# Patient Record
Sex: FEMALE | Race: WHITE | NOT HISPANIC OR LATINO | URBAN - METROPOLITAN AREA
[De-identification: names, ages, dates, MRNs, and addresses within clinical notes are randomized per-mention and may not be internally consistent; named-entity substitution may affect disease eponyms.]

---

## 2021-01-13 ENCOUNTER — NURSING HOME VISIT (OUTPATIENT)
Dept: GERIATRICS | Facility: OTHER | Age: 86
End: 2021-01-13
Payer: MEDICARE

## 2021-01-13 DIAGNOSIS — R63.4 WEIGHT LOSS: ICD-10-CM

## 2021-01-13 DIAGNOSIS — F43.21 ADJUSTMENT DISORDER WITH DEPRESSED MOOD: ICD-10-CM

## 2021-01-13 DIAGNOSIS — I10 ESSENTIAL HYPERTENSION: ICD-10-CM

## 2021-01-13 DIAGNOSIS — R50.9 ELEVATED TEMPERATURE: ICD-10-CM

## 2021-01-13 DIAGNOSIS — G30.1 LATE ONSET ALZHEIMER'S DISEASE WITH BEHAVIORAL DISTURBANCE (HCC): ICD-10-CM

## 2021-01-13 DIAGNOSIS — I48.21 PERMANENT ATRIAL FIBRILLATION (HCC): ICD-10-CM

## 2021-01-13 DIAGNOSIS — F02.81 LATE ONSET ALZHEIMER'S DISEASE WITH BEHAVIORAL DISTURBANCE (HCC): ICD-10-CM

## 2021-01-13 DIAGNOSIS — S32.592A OTHER SPECIFIED FRACTURE OF LEFT PUBIS, INITIAL ENCOUNTER FOR CLOSED FRACTURE (HCC): ICD-10-CM

## 2021-01-13 DIAGNOSIS — R26.2 AMBULATORY DYSFUNCTION: Primary | ICD-10-CM

## 2021-01-13 PROBLEM — R79.89 LOW VITAMIN D LEVEL: Status: ACTIVE | Noted: 2020-12-21

## 2021-01-13 PROBLEM — M48.062 SPINAL STENOSIS OF LUMBAR REGION WITH NEUROGENIC CLAUDICATION: Status: ACTIVE | Noted: 2020-06-30

## 2021-01-13 PROBLEM — M47.817 SPONDYLOSIS OF LUMBOSACRAL JOINT WITHOUT MYELOPATHY: Status: ACTIVE | Noted: 2020-06-30

## 2021-01-13 PROBLEM — F32.9 REACTIVE DEPRESSION: Status: ACTIVE | Noted: 2020-12-21

## 2021-01-13 PROBLEM — E78.5 HYPERLIPIDEMIA: Status: ACTIVE | Noted: 2021-01-13

## 2021-01-13 PROBLEM — N63.20 BREAST MASS, LEFT: Status: ACTIVE | Noted: 2017-08-17

## 2021-01-13 PROCEDURE — 99306 1ST NF CARE HIGH MDM 50: CPT | Performed by: FAMILY MEDICINE

## 2021-01-13 RX ORDER — MELATONIN
1000 DAILY
COMMUNITY

## 2021-01-13 RX ORDER — CALCIUM CARBONATE 200(500)MG
1 TABLET,CHEWABLE ORAL DAILY
COMMUNITY

## 2021-01-13 RX ORDER — NAPROXEN SODIUM 220 MG
220 TABLET ORAL DAILY
COMMUNITY

## 2021-01-13 RX ORDER — ASPIRIN 81 MG/1
81 TABLET, CHEWABLE ORAL DAILY
COMMUNITY

## 2021-01-13 RX ORDER — AMOXICILLIN 250 MG
1 CAPSULE ORAL DAILY
COMMUNITY

## 2021-01-13 RX ORDER — DILTIAZEM HYDROCHLORIDE 240 MG/1
240 CAPSULE, EXTENDED RELEASE ORAL DAILY
COMMUNITY

## 2021-01-13 RX ORDER — CITALOPRAM 10 MG/1
10 TABLET ORAL DAILY
COMMUNITY
End: 2021-03-15

## 2021-01-13 NOTE — PROGRESS NOTES
[address]  Facility: [facility]      NAME: Jaclyn Amaya  AGE: 80 y o  SEX: female    DATE OF ENCOUNTER: 1/13/2021    Code status:  CPR    Assessment and Plan     Ambulatory dysfunction  Multifactorial  Needs NH care  Continues with PT/OT  Late onset Alzheimer's disease with behavioral disturbance ([address] )  No behaviors  Loosing wt  Will continue with monitoring  Needs NH care  St on board  Permanent atrial fibrillation (HCC)  On  Digoxin  HR Controlled with Diltiazem, and ASA  Will check dig level  Hypertension  On Diltiazem  Stable BP  Will monitor closely  Adjustment disorder with depressed mood  On low dose of Celexa  E-lytes stable on today's lab  Will continue with monitoring  Other specified fracture of left pubis, initial encounter for closed fracture (HCC)  No pain  F/u with ortho this month  Continues with therapy  Stable  Weight loss  Variable meal completion  Will check labs  Elevated temperature  Close monitoring  Will check labs  Vitals q shift for 3 days  All medications and routine orders were reviewed and updated as needed  Plan discussed with: Nurse    Chief Complaint     Pt has no complaint     History of Present Illness   Pt with [facility] and medical problem as this note who was admitted to [facility] on 12/19/2021 under a [facility] provider after hospitalization due to left pubic rami fracture  She is not at C2 unit under my care  Pt with Afib, HTN, alzheimer's disease, and ambulatory dysfucntion  Pt has been loosing wt with variable meal completion  Had BMP today which is stable  She has been started on low dose of Celexa on 12/30 for depression/adjustment disorder  BP is stable  HR stable  Pt continues with NH care, and is getting therapy  Pt has no complaint  Pt has orthopedic appointment on 1/27  Denies having any pain  Pt had low grade temp yesterday         HISTORY:  Past Medical History: Diagnosis Date    Alzheimer disease [facility] )     Breast mass, left     Closed [facility] of multiple pubic rami, [facility]     HLD (hyperlipidemia)     HTN (hypertension)     Vertebral artery stenosis      Family History   Family history unknown: Yes     Social History     Socioeconomic History    Marital status:      Spouse name: None    Number of children: None    Years of education: None    Highest education level: None   Occupational History    None   Social Needs    Financial resource strain: None    Food insecurity     Worry: None     Inability: None    Transportation needs     Medical: None     Non-medical: None   Tobacco Use    Smoking status: Former Smoker    Smokeless tobacco: Never Used   Substance and Sexual Activity    Alcohol use: Not Currently    Drug use: Never    Sexual activity: Not Currently   Lifestyle    Physical activity     Days per week: None     Minutes per session: None    Stress: None   Relationships    Social connections     Talks on phone: None     Gets together: None     Attends Yazidism service: None     Active member of club or organization: None     Attends meetings of clubs or organizations: None     Relationship status: None    Intimate partner violence     Fear of current or ex partner: None     Emotionally abused: None     Physically abused: None     Forced sexual activity: None   Other Topics Concern    None   Social History Narrative    None       Allergies:  Ace inh, Cisapride, PCN    Review of Systems     Review of Systems   Unable to perform ROS: Dementia   Constitutional: Negative  "I'm fine"   HENT: Negative  Eyes: Negative  Respiratory: Negative  Cardiovascular: Negative  Gastrointestinal: Negative  Endocrine: Negative  Genitourinary: Negative  Musculoskeletal: Negative  Skin: Negative  Allergic/Immunologic: Negative  Neurological: Negative  Hematological: Negative  Psychiatric/Behavioral: Negative  All other systems reviewed and are negative  Medications and orders     All medications reviewed and updated in Nursing Home EMR  Objective     Vitals: wt:131 4Ibs        BP:130/59     Temp:98 5     MS:84   RR:20    Physical Exam  Vitals signs and nursing note reviewed  Constitutional:       General: She is not in acute distress  Appearance: Normal appearance  She is well-developed  She is not ill-appearing, toxic-appearing or diaphoretic  HENT:      Head: Normocephalic and atraumatic  Right Ear: External ear normal       Left Ear: External ear normal       Ears:      Comments: [place]     Nose: Nose normal  No congestion or rhinorrhea  Mouth/Throat:      Pharynx: No oropharyngeal exudate or posterior oropharyngeal erythema  Eyes:      General: No scleral icterus  Right eye: No discharge  Left eye: No discharge  Conjunctiva/sclera: Conjunctivae normal       Pupils: Pupils are equal, round, and reactive to light  Neck:      Musculoskeletal: Normal range of motion and neck supple  No neck rigidity or muscular tenderness  Cardiovascular:      Rate and Rhythm: Normal rate  Rhythm irregular  Heart sounds: Normal heart sounds  No murmur  No friction rub  No gallop  Comments: Irregular, irregular  Pulmonary:      Effort: Pulmonary effort is normal  No respiratory distress  Breath sounds: Normal breath sounds  No stridor  No wheezing, rhonchi or rales  Chest:      Chest wall: No tenderness  Abdominal:      General: Abdomen is flat  Bowel sounds are normal  There is no distension  Palpations: Abdomen is soft  There is no mass  Tenderness: There is no abdominal tenderness  There is no guarding or rebound  Hernia: No hernia is present  Musculoskeletal:         General: Deformity (bunion of b/L great toes ) present  No tenderness or signs of injury  Right lower leg: No edema        Left lower leg: No edema  Comments: In bed, limited ROM of UEs and LEs  Lymphadenopathy:      Cervical: No cervical adenopathy  Skin:     General: Skin is warm and dry  Coloration: Skin is not jaundiced or pale  Findings: Erythema (slight erythema of left inner great toe ) present  No bruising, lesion or rash  Neurological:      Mental Status: She is alert  Cranial Nerves: Cranial nerve deficit ([place]) present  Comments: Not oriented, follows commands    Psychiatric:         Judgment: Judgment normal          Pertinent Laboratory/Diagnostic Studies: The following labs/studies were reviewed please see chart or hospital paperwork for details  BMP today     Lab Results   Component Value Date     WBC 9 2 12/17/2020     HGB 12 0 12/17/2020     HCT 35 2 12/17/2020     MCV 95 12/17/2020      12/17/2020       Lab Results   Component Value Date     GLUCOSE 155 (H) 12/19/2020     BUN 13 12/19/2020     CREATININE 0 81 12/19/2020     CALCIUM 9 6 12/19/2020      12/19/2020     K 3 5 12/19/2020     CO2 25 12/19/2020      12/19/2020     ALKPHOS 111 12/16/2020     ALBUMIN 3 5 12/16/2020     BILITOT 1 6 (H) 12/16/2020     PROT 7 5 12/16/2020     AST 24 12/16/2020     ALT 18 12/16/2020     ANIONGAP 8 12/19/2020     GFRC 63 12/19/2020   No results found for: [id number]   Lab Results   Component Value Date     TSH 1 55 07/27/2017       Imaging Review: Rachel Iqbal   Result   ECG 12-LEAD Status: Normal 12/16/2020 12:05 PM     Narrative EXAM REASON:   INTERPRETATION:   SINUS RHYTHM PREMATURE ATRIAL COMPLEXES   CANNOT RULE OUT ANTERIOR INFARCT (CITED ON OR BEFORE 11-JUN-2020)   ABNORMAL ECG   WHEN COMPARED WITH ECG OF 11-JUN-2020 11:57,   WA INTERVAL HAS DECREASED   Confirmed by AUDIE CONNORS, Ritesh Conde () on 12/16/2020 1:20:41 PM       Result   CT PELVIS WO CONTRAST Status: Normal 12/16/2020 4:23 PM     Narrative History: Fall   Abnormal x-ray with concern for fracture        CT scan of the pelvis without IV contrast  Comparison prior examination dated   6/11/2020        Contiguous axial thin collimation images of the pelvis were performed  Sagittal   and coronal reconstructions were performed from the axially acquired data   Valeta Rana is a fracture involving the body of the pubis on the left extending to   the pubic symphysis with partial callus formation  There is a fracture involving   the left inferior pubic ramus  Degenerative changes lower lumbar spine    Sigmoid diverticulosis with no evidence of diverticulitis  Status post   hysterectomy  No mass or free fluid         Impression IMPRESSION: Fracture with callus formation involving the body of the pubis on   the left    Left inferior pubic ramus fracture, likely acute        CT examination performed with dose lowering protocol in accordance with [employer]                   Workstation:[id number]       Result   XR CHEST 1 VW Status: Normal 12/16/2020 1:06 PM     Narrative Clinical: Preop  Elenita Jurist: Supine AP frontal chest x-ray       COMPARISON: Chest x-ray dated 6/11/2020       FINDINGS: The heart is not enlarged  Calcifications are seen in the thoracic   aorta   The lungs are clear          Impression IMPRESSION: No acute pulmonary disease   Leroy Leija MD  1/13/2021 5:11 PM

## 2021-02-07 NOTE — PROGRESS NOTES
[facility]  [address]  [phone]  Facility: [place] [address]          NAME: Aparna Amaya  AGE: 80 y o  SEX: female    DATE OF ENCOUNTER: 2/8/2021    Chief Complaint     Pt has no complaint this am    History of Present Illness     HPI    The following portions of the patient's history were reviewed and updated as appropriate (from facility chart and hospital records): allergies, current medications, past family history, past medical history, past social history, past surgical history and problem list  Pt had recent fall about couple of weeks ago  Pt has been having increased signs of behaviors and delusions for example the other night was saying her aunt works here and she wants to see her!   Pt has signs of anxiety specially toward afternoon and evening  Poor po intake  Continues with NH care  Pt had her 2nd covid-19 vaccine last week  Review of Systems     Review of Systems   Unable to perform ROS: Dementia       Active Problem List     Patient Active Problem List   Diagnosis    Breast mass, left    Hyperlipidemia    Hypertension    Late onset Alzheimer's disease with behavioral disturbance ([address] )    Low vitamin D level    Other specified fracture of left pubis, initial encounter for closed fracture ([address] )    Reactive depression    Spinal stenosis of lumbar region with neurogenic claudication    Spondylosis of lumbosacral joint without myelopathy    Permanent atrial fibrillation ([address] )    Ambulatory dysfunction    Adjustment disorder with depressed mood    Weight loss    Elevated temperature       Objective     Vitals:temp:99 1 today     Physical Exam  Vitals signs and nursing note reviewed  Constitutional:       General: She is not in acute distress  Appearance: Normal appearance  She is well-developed  She is not ill-appearing, toxic-appearing or diaphoretic  HENT:      Head: Normocephalic and atraumatic        Right Ear: External ear normal       Left Ear: External ear normal       Ears:      Comments: [place]     Nose: Nose normal       Mouth/Throat:      Mouth: Mucous membranes are moist    Eyes:      General: No scleral icterus  Right eye: No discharge  Left eye: No discharge  Conjunctiva/sclera: Conjunctivae normal       Pupils: Pupils are equal, round, and reactive to light  Neck:      Musculoskeletal: Normal range of motion and neck supple  No neck rigidity or muscular tenderness  Cardiovascular:      Rate and Rhythm: Normal rate  Rhythm irregular  Heart sounds: Normal heart sounds  No murmur  No friction rub  No gallop  Comments: Irregular, irregular  Pulmonary:      Effort: Pulmonary effort is normal  No respiratory distress  Breath sounds: Normal breath sounds  No stridor  No wheezing, rhonchi or rales  Chest:      Chest wall: No tenderness  Abdominal:      General: Bowel sounds are normal  There is no distension  Palpations: Abdomen is soft  There is no mass  Tenderness: There is no abdominal tenderness  There is no guarding or rebound  Hernia: No hernia is present  Genitourinary:     Comments: Deferred  Musculoskeletal:         General: Deformity (B/L great toes bunions ) present  No swelling, tenderness or signs of injury  Right lower leg: No edema  Left lower leg: No edema  Comments: In bed, lying limited ROM  Lymphadenopathy:      Cervical: No cervical adenopathy  Skin:     General: Skin is warm and dry  Coloration: Skin is not jaundiced or pale  Findings: Erythema (R foerarm has two sueprficial opening  ) present  No bruising, lesion or rash  Comments: Didn't examine sacral area  Neurological:      Mental Status: She is alert  Cranial Nerves: Cranial nerve deficit ([place]) present  Comments: Follows simple commands      Psychiatric:         Behavior: Behavior normal          Pertinent Laboratory/Diagnostic Studies:  CBC, BMP done today stable, Dig level on 1/15    Current Medications   Medication list in facility chart was reviewed and necessary changes made  Assessment and Plan     Late onset Alzheimer's disease with behavioral disturbance ([address] )  Labs stable today  Sundowning  Partly due to increased anxiety  Needs NH care, added 3 mg Melatonin, will increased Celexa to 20 mg with close monitoring  Adjustment disorder with depressed mood  On Celexa 10mg, will increase celexa to 20 mg, close monitoring, repeat BMP in 2 weeks  Melatonin 3mg po q hs       Carlitos Perrin MD  2/8/2021

## 2021-02-08 ENCOUNTER — NURSING HOME VISIT (OUTPATIENT)
Dept: GERIATRICS | Facility: OTHER | Age: 86
End: 2021-02-08
Payer: MEDICARE

## 2021-02-08 DIAGNOSIS — G30.1 LATE ONSET ALZHEIMER'S DISEASE WITH BEHAVIORAL DISTURBANCE (HCC): Primary | ICD-10-CM

## 2021-02-08 DIAGNOSIS — F43.21 ADJUSTMENT DISORDER WITH DEPRESSED MOOD: ICD-10-CM

## 2021-02-08 DIAGNOSIS — F02.81 LATE ONSET ALZHEIMER'S DISEASE WITH BEHAVIORAL DISTURBANCE (HCC): Primary | ICD-10-CM

## 2021-02-08 PROCEDURE — 99308 SBSQ NF CARE LOW MDM 20: CPT | Performed by: FAMILY MEDICINE

## 2021-02-08 RX ORDER — MIRTAZAPINE 7.5 MG/1
7.5 TABLET, FILM COATED ORAL
COMMUNITY

## 2021-02-08 NOTE — ASSESSMENT & PLAN NOTE
Labs stable today  Sundowning  Partly due to increased anxiety  Needs NH care, added 3 mg Melatonin, will increased Celexa to 20 mg with close monitoring

## 2021-02-08 NOTE — ASSESSMENT & PLAN NOTE
On Celexa 10mg, will increase celexa to 20 mg, close monitoring, repeat BMP in 2 weeks  Melatonin 3mg po q hs

## 2021-02-12 ENCOUNTER — NURSING HOME VISIT (OUTPATIENT)
Dept: GERIATRICS | Facility: OTHER | Age: 86
End: 2021-02-12
Payer: MEDICARE

## 2021-02-12 DIAGNOSIS — R63.4 WEIGHT LOSS: ICD-10-CM

## 2021-02-12 DIAGNOSIS — I10 ESSENTIAL HYPERTENSION: ICD-10-CM

## 2021-02-12 DIAGNOSIS — R26.2 AMBULATORY DYSFUNCTION: ICD-10-CM

## 2021-02-12 DIAGNOSIS — F43.21 ADJUSTMENT DISORDER WITH DEPRESSED MOOD: ICD-10-CM

## 2021-02-12 DIAGNOSIS — G30.1 LATE ONSET ALZHEIMER'S DISEASE WITH BEHAVIORAL DISTURBANCE (HCC): Primary | ICD-10-CM

## 2021-02-12 DIAGNOSIS — F02.81 LATE ONSET ALZHEIMER'S DISEASE WITH BEHAVIORAL DISTURBANCE (HCC): Primary | ICD-10-CM

## 2021-02-12 DIAGNOSIS — I48.21 PERMANENT ATRIAL FIBRILLATION (HCC): ICD-10-CM

## 2021-02-12 PROCEDURE — 99309 SBSQ NF CARE MODERATE MDM 30: CPT | Performed by: FAMILY MEDICINE

## 2021-02-12 NOTE — PROGRESS NOTES
[facility]  [address]  [phone]  Facility: [place] [address]          NAME: Amanda Amaya  AGE: 80 y o  SEX: female    DATE OF ENCOUNTER: 2/12/2021    Chief Complaint     Pt denies any complaint     History of Present Illness     HPI    The following portions of the patient's history were reviewed and updated as appropriate (from facility chart and hospital records): allergies, current medications, past family history, past medical history, past social history, past surgical history and problem list  Pt was seen and examined for f/u on Dementia, ambulatory dysfunction, Afib, HTN, adjustment disorder, wt loss  pt was seen on 2/8 due to increased behaviors and anxiety, her Celexa was increased in dose but pt never got the increased dose since she refused her meds for few days and her behaviors surprisingly got better, so her celexa was actually decreased in dose, pt was also put on Aricept since 2 days ago and she has been stable  Per therapy pt has had some problem with accepting male therapist but she has been cooperative with female OT or PT staff  Pt's wt stable  HR, and BP stable  Pt continues with NH care  No other specific issues or concerns  Review of Systems     Review of Systems   Unable to perform ROS: Dementia   Constitutional: Negative  HENT: Negative  Eyes: Negative  Respiratory: Negative  Cardiovascular: Negative  Gastrointestinal: Negative  Endocrine: Negative  Genitourinary: Negative  Musculoskeletal: Negative  Skin: Negative  Allergic/Immunologic: Negative  Neurological: Negative  Hematological: Negative  Psychiatric/Behavioral: Negative  All other systems reviewed and are negative        Active Problem List     Patient Active Problem List   Diagnosis    Breast mass, left    Hyperlipidemia    Hypertension    Late onset Alzheimer's disease with behavioral disturbance ([facility] )    Low vitamin D level    Other specified fracture of left pubis, initial encounter for closed fracture ([address] )    Reactive depression    Spinal stenosis of lumbar region with neurogenic claudication    Spondylosis of lumbosacral joint without myelopathy    Permanent atrial fibrillation (HCC)    Ambulatory dysfunction    Adjustment disorder with depressed mood    Weight loss    Elevated temperature       Objective     Vitals: wt:130 5Ibs        BP:138/72       MO:76    RR:18   Temp:98 7    Physical Exam  Vitals signs and nursing note reviewed  Constitutional:       General: She is not in acute distress  Appearance: Normal appearance  She is well-developed  She is not ill-appearing, toxic-appearing or diaphoretic  HENT:      Head: Normocephalic and atraumatic  Right Ear: External ear normal       Left Ear: External ear normal       Ears:      Comments: [place]     Nose: Nose normal  No congestion or rhinorrhea  Mouth/Throat:      Mouth: Mucous membranes are moist    Eyes:      General: No scleral icterus  Right eye: No discharge  Left eye: No discharge  Conjunctiva/sclera: Conjunctivae normal       Pupils: Pupils are equal, round, and reactive to light  Neck:      Musculoskeletal: Normal range of motion and neck supple  No neck rigidity or muscular tenderness  Cardiovascular:      Rate and Rhythm: Normal rate  Rhythm irregular  Heart sounds: Normal heart sounds  No murmur  No friction rub  No gallop  Comments: Irregular, irregular  Pulmonary:      Effort: Pulmonary effort is normal  No respiratory distress  Breath sounds: Normal breath sounds  No stridor  No wheezing, rhonchi or rales  Chest:      Chest wall: No tenderness  Abdominal:      General: Bowel sounds are normal  There is no distension  Palpations: Abdomen is soft  There is no mass  Tenderness: There is no abdominal tenderness  There is no guarding or rebound  Hernia: No hernia is present     Genitourinary: Comments: Deferred  Musculoskeletal:         General: No swelling, tenderness, deformity or signs of injury  Right lower leg: No edema  Left lower leg: No edema  Comments: In WC, sitting, moves extremities, limited ROM  Lymphadenopathy:      Cervical: No cervical adenopathy  Skin:     General: Skin is warm and dry  Coloration: Skin is not jaundiced or pale  Findings: No bruising, erythema, lesion or rash  Comments: Didn't examine sacral area  Neurological:      Mental Status: She is alert  Cranial Nerves: Cranial nerve deficit ([place]) present  Comments: Follows simple commands  Not oriented  Psychiatric:         Behavior: Behavior normal          Pertinent Laboratory/Diagnostic Studies:  BMP, CBC, dig level on 1/15/2021    Current Medications   Medication list in facility chart was reviewed and no changes made  Assessment and Plan     Late onset Alzheimer's disease with behavioral disturbance ([address] )  Started on Aricept since 2 days ago, will monitor closely  Permanent atrial fibrillation (HCC)  HR is controlled with Diltiazem, on ASA  Will continue with monitoring  Hypertension  BP stable with Diltiazem  Will continue with monitoring  Adjustment disorder with depressed mood  On decreased dose of Celexa  Stable  Ambulatory dysfunction  Therapy on board  Needs NH care  Weight loss  Wt currently is stable  Will continue with monitoring       Ivett Mariano MD  5/[id number]/36967:24 PM

## 2021-03-12 ENCOUNTER — NURSING HOME VISIT (OUTPATIENT)
Dept: GERIATRICS | Facility: OTHER | Age: 86
End: 2021-03-12
Payer: MEDICARE

## 2021-03-12 DIAGNOSIS — R63.4 WEIGHT LOSS: Primary | ICD-10-CM

## 2021-03-12 DIAGNOSIS — R41.82 ALTERED MENTAL STATUS, UNSPECIFIED ALTERED MENTAL STATUS TYPE: ICD-10-CM

## 2021-03-12 PROCEDURE — 99308 SBSQ NF CARE LOW MDM 20: CPT | Performed by: FAMILY MEDICINE

## 2021-03-12 NOTE — ASSESSMENT & PLAN NOTE
??? Medication related vs overall decline, ordered SQ fluid replacement, repeat labs on 3/15  Vitals q shift

## 2021-03-12 NOTE — ASSESSMENT & PLAN NOTE
Significant with poor po intake, on Remeron since 2 days ago to help with anxiety and help with pt's appetite, will continue with monitoring

## 2021-03-12 NOTE — PROGRESS NOTES
[facility]  [address]  [phone]  Facility: [place] [address]          NAME: Pelon Amaya  AGE: 80 y o  SEX: female    DATE OF ENCOUNTER: 3/12/2021    Chief Complaint     Pt has no specific complaint      History of Present Illness     HPI    The following portions of the patient's history were reviewed and updated as appropriate (from facility chart and hospital records): allergies, current medications, past family history, past medical history, past social history, past surgical history and problem list   Pt was seen and examined per staff report and concern regarding pt being lethargic this am with no po intake  Pt has been having significant wt loss with poor po intake  She has been started on Aricept since 2/10, on/off has been having behaviors, throwing things toward staff, and being paranoid  She was started on Remeron 2 days ago to help with her anxiety and her appetite  Per Tasha Juárez (nurse) pt was lethargic at times in the morning , couple of weeks ago when Tasha Juárez was working in the unit, and later in the morning was getting better  Pt is drowsy but arousable  Answers limited questions with yes or no  A phone called was placed to pt's daughter and message was left  Pt's daughter called later and talked to nursing staff, per staff report, daughter wants to keep pt;s code status as "CPR", and would like to discuss on pt's meds on Monday 3/15/2021  Pt is afebrile, no sign or symptoms of any infection at this time  Vitals stable  Review of Systems     Review of Systems   Unable to perform ROS: Dementia (limimted )   Respiratory: Negative for shortness of breath  Musculoskeletal:        Pt denied pain    Skin: Negative          Active Problem List     Patient Active Problem List   Diagnosis    Breast mass, left    Hyperlipidemia    Hypertension    Late onset Alzheimer's disease with behavioral disturbance (HCC)    Low vitamin D level    Other specified fracture of left pubis, initial encounter for closed fracture ([address] )    Reactive depression    Spinal stenosis of lumbar region with neurogenic claudication    Spondylosis of lumbosacral joint without myelopathy    Permanent atrial fibrillation (HCC)    Ambulatory dysfunction    Adjustment disorder with depressed mood    Weight loss    Elevated temperature    Altered mental state       Objective     Vitals:wt:117 7Ibs               Temp:98 5   ME:72     Physical Exam  Vitals signs and nursing note reviewed  Constitutional:       General: She is not in acute distress  Appearance: She is well-developed  She is not ill-appearing, toxic-appearing or diaphoretic  Comments: Drowsy but arousable  HENT:      Head: Normocephalic and atraumatic  Right Ear: External ear normal       Left Ear: External ear normal       Ears:      Comments: [place]     Nose: Nose normal  No congestion or rhinorrhea  Mouth/Throat:      Comments: Missing teeth, dry lips, mouth open   Eyes:      General: No scleral icterus  Right eye: No discharge  Left eye: No discharge  Extraocular Movements: Extraocular movements intact  Conjunctiva/sclera: Conjunctivae normal       Pupils: Pupils are equal, round, and reactive to light  Comments: Briefly opened her eyes on command  Neck:      Musculoskeletal: Normal range of motion and neck supple  No neck rigidity or muscular tenderness  Cardiovascular:      Rate and Rhythm: Normal rate  Rhythm irregular  Heart sounds: Normal heart sounds  No murmur  No friction rub  No gallop  Comments: Irregular, irregular  Pulmonary:      Effort: Pulmonary effort is normal  No respiratory distress  Breath sounds: No stridor  No wheezing, rhonchi or rales  Comments: Diminished BS  Chest:      Chest wall: No tenderness  Abdominal:      General: Bowel sounds are normal  There is no distension  Palpations: Abdomen is soft  There is no mass  Tenderness: There is no abdominal tenderness  There is no guarding or rebound  Hernia: No hernia is present  Genitourinary:     Comments: Deferred  Musculoskeletal: Normal range of motion  General: No swelling, tenderness, deformity or signs of injury  Right lower leg: No edema  Left lower leg: No edema  Lymphadenopathy:      Cervical: No cervical adenopathy  Skin:     General: Skin is warm and dry  Coloration: Skin is not jaundiced or pale  Findings: No bruising, erythema, lesion or rash  Comments: Didn't examine sacral area  Neurological:      Cranial Nerves: Cranial nerve deficit present  Comments: [place], not oriented, opened her eyes on demand, follows simple commands  Psychiatric:         Behavior: Behavior normal          Pertinent Laboratory/Diagnostic Studies:  No lab for this visit     Current Medications   Medication list in facility chart was reviewed and necessary changes made  Assessment and Plan     Weight loss  Significant with poor po intake, on Remeron since 2 days ago to help with anxiety and help with pt's appetite, will continue with monitoring  Altered mental state  ? ?? Medication related vs overall decline, ordered SQ fluid replacement, repeat labs on 3/15  Vitals q shift         Francesco Manning MD  3/12/60387:18 PM

## 2021-03-15 ENCOUNTER — NURSING HOME VISIT (OUTPATIENT)
Dept: GERIATRICS | Facility: OTHER | Age: 86
End: 2021-03-15
Payer: MEDICARE

## 2021-03-15 DIAGNOSIS — R13.12 OROPHARYNGEAL DYSPHAGIA: ICD-10-CM

## 2021-03-15 DIAGNOSIS — G30.1 LATE ONSET ALZHEIMER'S DISEASE WITH BEHAVIORAL DISTURBANCE (HCC): Primary | ICD-10-CM

## 2021-03-15 DIAGNOSIS — F02.81 LATE ONSET ALZHEIMER'S DISEASE WITH BEHAVIORAL DISTURBANCE (HCC): Primary | ICD-10-CM

## 2021-03-15 DIAGNOSIS — N17.9 ACUTE RENAL FAILURE, UNSPECIFIED ACUTE RENAL FAILURE TYPE (HCC): ICD-10-CM

## 2021-03-15 DIAGNOSIS — R63.4 WEIGHT LOSS: ICD-10-CM

## 2021-03-15 PROCEDURE — 99309 SBSQ NF CARE MODERATE MDM 30: CPT | Performed by: FAMILY MEDICINE

## 2021-03-15 RX ORDER — DIGOXIN 125 MCG
125 TABLET ORAL DAILY
COMMUNITY

## 2021-03-15 NOTE — PROGRESS NOTES
[facility]  [address]  [phone]  Facility: [place] [address]  Code Status: DNR/DNI/DNH/Comfort measures        NAME: Lonnie Pyle  AGE: 80 y o  SEX: female    DATE OF ENCOUNTER: 3/15/2021    Chief Complaint     Pt has no specific complaint today    History of Present Illness     HPI    The following portions of the patient's history were reviewed and updated as appropriate (from facility chart and hospital records): allergies, current medications, past family history, past medical history, past social history, past surgical history and problem list  Pt was seen and examined for f/u on CARTER, wt loss, and altered mental status which was started on 3/12  Pt received SQ fluid for total of 3 days and she was more alert yesterday per staff report and today is out of bed, working with therapy, following PT's directions  Pt had labs today which were reviewed and her renal function is back to her base  Per staff report this morning pt when was eating her breakfast had a coughing episode  Pt denies any pain  States that she is always hungry but per staff report when food is placed in front of her she doesn't eat much  I had a phone conversation with pt's daughter Belem Mathew today and she also confirmed that pt always was not a good eater  Chandana (unit manger) was also present during the phone conversation  I explained to daughter regarding reasons for starting pt on Remeron (since Celexa was not helpful and also pt has significant wt loss, and poor appetite)  We also discussed pt's Code status and Belem Mathew mentioned that pt's code status was as CPR since her living will was not available,and now there is a copy in the chart and so Belem Mathew confirmed pt's code status as DNR/DNI, and also agreed for pt to be [facility] and comfort measures  Belem Mathew also agreed to continue with Remeron for 1-2 weeks and see if pt improves with her po intake, and mood   Pt was evaluated by  kashif and it was recommend her diet to be changed to pureed diet  (was on mechanical soft diet)  Pt reports that she likes mashed potato  Review of Systems     Review of Systems   Reason unable to perform ROS: limited ROS due to dementia  Constitutional:        "I'm always hungry"   Musculoskeletal:        Denies any pain  Active Problem List     Patient Active Problem List   Diagnosis    Breast mass, left    Hyperlipidemia    Hypertension    Late onset Alzheimer's disease with behavioral disturbance ([address] )    Low vitamin D level    Other specified fracture of left pubis, initial encounter for closed fracture ([facility] [address] )    Reactive depression    Spinal stenosis of lumbar region with neurogenic claudication    Spondylosis of lumbosacral joint without myelopathy    Permanent atrial fibrillation ([facility] [address] [facility] )    Ambulatory dysfunction    Adjustment disorder with depressed mood    Weight loss    Elevated temperature    Altered mental state    Oropharyngeal dysphagia    Acute kidney failure (HCC)       Objective     Vitals: wt:117 7Ibs          BP:130/60           CT:74   RR:18       Afebrile     Physical Exam  Vitals signs and nursing note reviewed  Constitutional:       General: She is not in acute distress  Appearance: Normal appearance  She is well-developed  She is not ill-appearing, toxic-appearing or diaphoretic  HENT:      Head: Normocephalic and atraumatic  Right Ear: External ear normal       Left Ear: External ear normal       Ears:      Comments: [place]     Nose: Nose normal  No congestion or rhinorrhea  Mouth/Throat:      Mouth: Mucous membranes are moist       Comments: Missing teeth  Eyes:      General: No scleral icterus  Right eye: No discharge  Left eye: No discharge  Conjunctiva/sclera: Conjunctivae normal       Pupils: Pupils are equal, round, and reactive to light  Neck:      Musculoskeletal: Normal range of motion and neck supple  No neck rigidity or muscular tenderness  Cardiovascular:      Rate and Rhythm: Normal rate  Rhythm irregular  Heart sounds: Normal heart sounds  No murmur  No friction rub  No gallop  Comments: [place], irregular    Pulmonary:      Effort: Pulmonary effort is normal  No respiratory distress  Breath sounds: Normal breath sounds  No stridor  No wheezing, rhonchi or rales  Chest:      Chest wall: No tenderness  Abdominal:      General: Bowel sounds are normal  There is no distension  Palpations: Abdomen is soft  There is no mass  Tenderness: There is no abdominal tenderness  There is no guarding or rebound  Hernia: No hernia is present  Genitourinary:     Comments: Deferred   Musculoskeletal: Normal range of motion  General: No swelling, tenderness, deformity or signs of injury  Right lower leg: No edema  Left lower leg: No edema  Comments: In [place], sitting, moves all extremities  Lymphadenopathy:      Cervical: No cervical adenopathy  Skin:     General: Skin is warm and dry  Coloration: Skin is not jaundiced or pale  Findings: No bruising, erythema, lesion or rash  Neurological:      Mental Status: She is alert  Cranial Nerves: Cranial nerve deficit ([place]) present  Comments: Not oriented, follows commands  Pertinent Laboratory/Diagnostic Studies:  BMP, CBC for today was reviewed     Current Medications   Medication list in facility chart was reviewed and no changes made  Assessment and Plan     Late onset Alzheimer's disease with behavioral disturbance ([address] )  Continues with poor po intake  Changed code status to DNR/DNI,and pt will also be DNH with comfort measures  Oropharyngeal dysphagia  Agree with ST recommendations to change pt's diet to pureed  Will monitor closely  Acute kidney failure (HCC)  Pt's renal function back to normal after SQ hydration and on today's labs  Will continue with monitoring       Weight loss  On Remeron, will continue with monitoring  Staff to provide pt more mashed potato as pt reports liking it  On house shake  Will continue with monitoring       Yadira Lara MD  5/24/25797:69 PM

## 2021-03-15 NOTE — ASSESSMENT & PLAN NOTE
Continues with poor po intake  Changed code status to DNR/DNI,and pt will also be DNH with comfort measures

## 2021-03-15 NOTE — ASSESSMENT & PLAN NOTE
Pt's renal function back to normal after SQ hydration and on today's labs  Will continue with monitoring

## 2021-03-15 NOTE — ASSESSMENT & PLAN NOTE
On Remeron, will continue with monitoring  Staff to provide pt more mashed potato as pt reports liking it  On house shake  Will continue with monitoring

## 2021-04-14 ENCOUNTER — NURSING HOME VISIT (OUTPATIENT)
Dept: GERIATRICS | Facility: OTHER | Age: 86
End: 2021-04-14
Payer: MEDICARE

## 2021-04-14 DIAGNOSIS — G30.1 LATE ONSET ALZHEIMER'S DISEASE WITH BEHAVIORAL DISTURBANCE (HCC): ICD-10-CM

## 2021-04-14 DIAGNOSIS — I48.21 PERMANENT ATRIAL FIBRILLATION (HCC): Primary | ICD-10-CM

## 2021-04-14 DIAGNOSIS — F02.81 LATE ONSET ALZHEIMER'S DISEASE WITH BEHAVIORAL DISTURBANCE (HCC): ICD-10-CM

## 2021-04-14 DIAGNOSIS — F43.21 ADJUSTMENT DISORDER WITH DEPRESSED MOOD: ICD-10-CM

## 2021-04-14 DIAGNOSIS — R63.4 WEIGHT LOSS: ICD-10-CM

## 2021-04-14 DIAGNOSIS — R13.12 OROPHARYNGEAL DYSPHAGIA: ICD-10-CM

## 2021-04-14 DIAGNOSIS — I10 ESSENTIAL HYPERTENSION: ICD-10-CM

## 2021-04-14 PROBLEM — N17.9 ACUTE KIDNEY FAILURE (HCC): Status: RESOLVED | Noted: 2021-03-15 | Resolved: 2021-04-14

## 2021-04-14 PROCEDURE — 99309 SBSQ NF CARE MODERATE MDM 30: CPT | Performed by: FAMILY MEDICINE

## 2021-04-14 NOTE — PROGRESS NOTES
[facility]  [address]  [phone]  Facility: [place] [address]          NAME: Kemar Amaya  AGE: 80 y o  SEX: female    DATE OF ENCOUNTER: 4/14/2021    Chief Complaint     Pt has no specific complaint     History of Present Illness     HPI    The following portions of the patient's history were reviewed and updated as appropriate (from facility chart and hospital records): allergies, current medications, past family history, past medical history, past social history, past surgical history and problem list   Pt was seen and examined for fu on Dementia, dysphagia, wt loss, adjustment disorder, HTN, and Afib  Pt has been stable  Wt stable in last 2 months  BP stable  HR controlled  Mood stable  Per staff report pt's mood has been better, and he is eating better  No other specific issues or concerns  Tolerates mechanical soft diet  ;ast lab done in March and stable  Review of Systems     Review of Systems   Unable to perform ROS: Dementia   Constitutional:        "Pt denies any complaint"       Active Problem List     Patient Active Problem List   Diagnosis    Breast mass, left    Hyperlipidemia    Hypertension    Late onset Alzheimer's disease with behavioral disturbance ([address] )    Low vitamin D level    Other specified fracture of left pubis, initial encounter for closed fracture ([address] )    Reactive depression    Spinal stenosis of lumbar region with neurogenic claudication    Spondylosis of lumbosacral joint without myelopathy    Permanent atrial fibrillation ([address] )    Ambulatory dysfunction    Adjustment disorder with depressed mood    Weight loss    Elevated temperature    Altered mental state    Oropharyngeal dysphagia       Objective     Vitals: wt:119 6Ibs       BP:122/62    Afebrile   NV:78    Physical Exam  Vitals signs and nursing note reviewed  Constitutional:       General: She is not in acute distress  Appearance: Normal appearance   She is well-developed  She is not ill-appearing, toxic-appearing or diaphoretic  HENT:      Head: Normocephalic and atraumatic  Right Ear: External ear normal       Left Ear: External ear normal       Ears:      Comments: [place]     Nose: Nose normal  No congestion or rhinorrhea  Mouth/Throat:      Comments: Dentures   Eyes:      General: No scleral icterus  Right eye: No discharge  Left eye: No discharge  Conjunctiva/sclera: Conjunctivae normal       Pupils: Pupils are equal, round, and reactive to light  Neck:      Musculoskeletal: Normal range of motion and neck supple  No neck rigidity or muscular tenderness  Cardiovascular:      Rate and Rhythm: Normal rate  Rhythm irregular  Heart sounds: Normal heart sounds  No murmur  No friction rub  No gallop  Comments: Irregular  Irregular  Pulmonary:      Effort: Pulmonary effort is normal  No respiratory distress  Breath sounds: Normal breath sounds  No stridor  No wheezing, rhonchi or rales  Chest:      Chest wall: No tenderness  Abdominal:      General: Abdomen is flat  Bowel sounds are normal  There is no distension  Palpations: Abdomen is soft  There is no mass  Tenderness: There is no abdominal tenderness  There is no guarding or rebound  Hernia: No hernia is present  Genitourinary:     Comments: Deferred  Musculoskeletal:         General: No swelling, tenderness, deformity or signs of injury  Right lower leg: No edema  Left lower leg: No edema  Comments: In WC, sitting, limited ROM  Lymphadenopathy:      Cervical: No cervical adenopathy  Skin:     General: Skin is warm and dry  Coloration: Skin is not jaundiced or pale  Findings: Erythema (left LE small linear skin change stri-strips on ) present  No bruising, lesion or rash  Comments: Didn't examine sacral area  Neurological:      Mental Status: She is alert        Cranial Nerves: Cranial nerve deficit ([place]) present  Comments: Not oriented, follows commands  Psychiatric:         Behavior: Behavior normal          Pertinent Laboratory/Diagnostic Studies:  3/15: CBC, BMP  1/15:dig level     Current Medications   Medication list in facility chart was reviewed and necessary changes made  Assessment and Plan     Permanent atrial fibrillation (HCC)  HR stale with Diltiazem and Digoxin  On Asa  Stable  Oropharyngeal dysphagia  Tolerating mechanical soft diet  Stable  Late onset Alzheimer's disease with behavioral disturbance ([address] )  Stable with Aricept  Wt stable in last 2 months, will continue with monitoring  Hypertension  BP stable with Diltiazem  Will continue with monitoring  Weight loss  Wt is stable  Will continue with monitoring  Adjustment disorder with depressed mood  Stable off Celexa and on Remeron         Romain Ramirez MD  5/44/58872:94 PM

## 2021-04-26 ENCOUNTER — NURSING HOME VISIT (OUTPATIENT)
Dept: GERIATRICS | Facility: OTHER | Age: 86
End: 2021-04-26
Payer: MEDICARE

## 2021-04-26 DIAGNOSIS — M25.512 ACUTE PAIN OF LEFT SHOULDER: Primary | ICD-10-CM

## 2021-04-26 PROCEDURE — 99308 SBSQ NF CARE LOW MDM 20: CPT | Performed by: FAMILY MEDICINE

## 2021-04-26 NOTE — PROGRESS NOTES
[address] [facility]  [address]  [phone]  Facility:[place] [address]        NAME: Rosa Elena Steen  AGE: 80 y o  SEX: female    DATE OF ENCOUNTER: 4/26/2021    Chief Complaint     Left shoulder pain     History of Present Illness     HPI    The following portions of the patient's history were reviewed and updated as appropriate (from facility chart and hospital records): allergies, current medications, past family history, past medical history, past social history, past surgical history and problem list  Pt was seen and examined per staff request and report of pt's left shoulder pain  Pt had a fall on 4/6 and xray was done which was negative for fracture  She is on scheduled Tylenol, but has some increased pain in left shoulder with favoring the right side  On today's exam pt has some point tenderness on her left shoulder  Review of Systems     Review of Systems   Unable to perform ROS: Dementia   Musculoskeletal:        Left shoulder pain, soreness        Active Problem List     Patient Active Problem List   Diagnosis    Breast mass, left    Hyperlipidemia    Hypertension    Late onset Alzheimer's disease with behavioral disturbance ([address] )    Low vitamin D level    Other specified fracture of left pubis, initial encounter for closed fracture ([address] )    Reactive depression    Spinal stenosis of lumbar region with neurogenic claudication    Spondylosis of lumbosacral joint without myelopathy    Permanent atrial fibrillation ([address] )    Ambulatory dysfunction    Adjustment disorder with depressed mood    Weight loss    Elevated temperature    Altered mental state    Oropharyngeal dysphagia    Acute pain of left shoulder       Objective     Vitals: Afebrile     Physical Exam  Musculoskeletal:         General: Tenderness (point tenderness of anterior shoulder  Monia Le ) present  Comments: In WC, sitting  Limited ROM of extremities, good ROM of lifting left shoulder, left arm  Limited left shoulder extension  Pertinent Laboratory/Diagnostic Studies:  No lab for this visit     Current Medications   Medication list in facility chart was reviewed and necessary changes made  Assessment and Plan   Acute pain of left shoulder  On scheduled Tylenol  Will add Bengay patch, with close monitoring       Chad Good MD  1/88/728206:90 PM

## 2021-05-19 ENCOUNTER — NURSING HOME VISIT (OUTPATIENT)
Dept: GERIATRICS | Facility: OTHER | Age: 86
End: 2021-05-19
Payer: MEDICARE

## 2021-05-19 DIAGNOSIS — I10 ESSENTIAL HYPERTENSION: ICD-10-CM

## 2021-05-19 DIAGNOSIS — R63.4 WEIGHT LOSS: ICD-10-CM

## 2021-05-19 DIAGNOSIS — R13.12 OROPHARYNGEAL DYSPHAGIA: ICD-10-CM

## 2021-05-19 DIAGNOSIS — F43.21 ADJUSTMENT DISORDER WITH DEPRESSED MOOD: ICD-10-CM

## 2021-05-19 DIAGNOSIS — I48.21 PERMANENT ATRIAL FIBRILLATION (HCC): ICD-10-CM

## 2021-05-19 DIAGNOSIS — F02.81 LATE ONSET ALZHEIMER'S DISEASE WITH BEHAVIORAL DISTURBANCE (HCC): Primary | ICD-10-CM

## 2021-05-19 DIAGNOSIS — G30.1 LATE ONSET ALZHEIMER'S DISEASE WITH BEHAVIORAL DISTURBANCE (HCC): Primary | ICD-10-CM

## 2021-05-19 PROBLEM — R41.82 ALTERED MENTAL STATE: Status: RESOLVED | Noted: 2021-03-12 | Resolved: 2021-05-19

## 2021-05-19 PROCEDURE — 99309 SBSQ NF CARE MODERATE MDM 30: CPT | Performed by: FAMILY MEDICINE

## 2021-05-20 NOTE — PROGRESS NOTES
[facility]  [address]  [phone]  Facility: [place] [address]          NAME: Denise Amaya  AGE: 80 y o  SEX: female    DATE OF ENCOUNTER: 5/19/2021    Chief Complaint     Pt has no specific complaint     History of Present Illness     HPI    The following portions of the patient's history were reviewed and updated as appropriate (from facility chart and hospital records): allergies, current medications, past family history, past medical history, past social history, past surgical history and problem list   Pt was seen and examined for f/u on Afib, Dementia, Dysphagia, HTN, wt loss, and adjustment disorder  Pt has been stable  Continues with NH care  BP stable  Pt's wt has been stable  HR controlled  Behaviors area better controlled  Tolerates mechanical soft diet  Left shoulder pain is stable with Bengay and Tylenol  Pt has no specific complaint  Review of Systems     Review of Systems   Unable to perform ROS: Dementia       Active Problem List     Patient Active Problem List   Diagnosis    Breast mass, left    Hyperlipidemia    Hypertension    Late onset Alzheimer's disease with behavioral disturbance ([address] )    Low vitamin D level    Other specified fracture of left pubis, initial encounter for closed fracture ([address] )    Reactive depression    Spinal stenosis of lumbar region with neurogenic claudication    Spondylosis of lumbosacral joint without myelopathy    Permanent atrial fibrillation ([address] )    Ambulatory dysfunction    Adjustment disorder with depressed mood    Weight loss    Elevated temperature    Oropharyngeal dysphagia    Acute pain of left shoulder       Objective     Vitals: wt:121 9Ibs   BP:147/66    WY:76   RR:18   Afebrile     Physical Exam  Vitals signs and nursing note reviewed  Constitutional:       General: She is not in acute distress  Appearance: Normal appearance  She is well-developed   She is not ill-appearing, toxic-appearing or diaphoretic  HENT:      Head: Normocephalic and atraumatic  Right Ear: External ear normal  There is no impacted cerumen  Left Ear: External ear normal  There is no impacted cerumen  Ears:      Comments: [place]     Nose: Nose normal  No congestion or rhinorrhea  Mouth/Throat:      Comments: Missing teeth   Eyes:      General: No scleral icterus  Right eye: No discharge  Left eye: No discharge  Conjunctiva/sclera: Conjunctivae normal       Pupils: Pupils are equal, round, and reactive to light  Neck:      Musculoskeletal: Normal range of motion and neck supple  No neck rigidity or muscular tenderness  Cardiovascular:      Rate and Rhythm: Normal rate and regular rhythm  Heart sounds: Normal heart sounds  No murmur  No friction rub  No gallop  Pulmonary:      Effort: Pulmonary effort is normal  No respiratory distress  Breath sounds: Normal breath sounds  No stridor  No wheezing, rhonchi or rales  Chest:      Chest wall: No tenderness  Abdominal:      General: Bowel sounds are normal  There is no distension  Palpations: Abdomen is soft  There is no mass  Tenderness: There is no abdominal tenderness  There is no guarding or rebound  Hernia: No hernia is present  Genitourinary:     Comments: Deferred  Musculoskeletal:         General: No swelling, tenderness, deformity or signs of injury  Right lower leg: No edema  Left lower leg: No edema  Comments: limted ROM more on left shoulder    Lymphadenopathy:      Cervical: No cervical adenopathy  Skin:     General: Skin is warm and dry  Coloration: Skin is not jaundiced or pale  Findings: Erythema present  No bruising, lesion or rash  Comments: Didn't examine sacral area  Neurological:      Mental Status: She is alert  Cranial Nerves: Cranial nerve deficit ([place]) present        Comments: forgetful   Psychiatric:         Behavior: Behavior normal  Pertinent Laboratory/Diagnostic Studies:  3/15:CBC,CMP1/18:Dig,     Current Medications   Medication list in facility chart was reviewed and no changes made  Assessment and Plan   Late onset Alzheimer's disease with behavioral disturbance ([facility] [address] )  Wt stable, behaviors better, needs NH Care  Permanent atrial fibrillation (HCC)  HR is controlled with Diltiazem and digoxin  Will continue with monitoring  Hypertension  BP stable with Diltiazem  Last lab done in March, stable  Oropharyngeal dysphagia  Tolerates mechanical soft diet  Stable  Weight loss  Wt has been stable  Adjustment disorder with depressed mood  Stable with Remeron       Shy Bustos MD  2/09/48468:56 PM

## 2021-06-07 ENCOUNTER — NURSING HOME VISIT (OUTPATIENT)
Dept: GERIATRICS | Facility: OTHER | Age: 86
End: 2021-06-07
Payer: MEDICARE

## 2021-06-07 DIAGNOSIS — R05.9 COUGH: Primary | ICD-10-CM

## 2021-06-07 PROCEDURE — 99308 SBSQ NF CARE LOW MDM 20: CPT | Performed by: FAMILY MEDICINE

## 2021-06-07 NOTE — PROGRESS NOTES
[facility]  [address]  [phone]  Facility: [place] [address]          NAME: Kemar Amaya  AGE: 80 y o  SEX: female    DATE OF ENCOUNTER: 6/7/2021    Chief Complaint     Pt has no specific complaint     History of Present Illness     HPI    The following portions of the patient's history were reviewed and updated as appropriate (from facility chart and hospital records): allergies, current medications, past family history, past medical history, past social history, past surgical history and problem list   Pt was seen and examined for f/u on her cough which started over the weekend  Pt had CXR whish was negative  Pt has been started on cough syrup but still has some moist cough  Denies any SOB, MEADOWS or CP  Vitals have been stable  No fever  O2 sats good  Review of Systems     Review of Systems   Unable to perform ROS: Dementia   Respiratory:        No SOB, no MEADOWS, + cough    Cardiovascular: Negative for chest pain  Active Problem List     Patient Active Problem List   Diagnosis    Breast mass, left    Hyperlipidemia    Hypertension    Late onset Alzheimer's disease with behavioral disturbance ([address] )    Low vitamin D level    Other specified fracture of left pubis, initial encounter for closed fracture ([address] )    Reactive depression    Spinal stenosis of lumbar region with neurogenic claudication    Spondylosis of lumbosacral joint without myelopathy    Permanent atrial fibrillation ([address] )    Ambulatory dysfunction    Adjustment disorder with depressed mood    Weight loss    Elevated temperature    Oropharyngeal dysphagia    Acute pain of left shoulder    Cough       Objective     Vitals: wt:121 9Ib     BP:122/62   Afebrile        SAO2 log reviewed     Physical Exam  Vitals signs and nursing note reviewed  Constitutional:       General: She is not in acute distress  Appearance: Normal appearance  She is well-developed   She is not ill-appearing, toxic-appearing or diaphoretic  HENT:      Head: Normocephalic and atraumatic  Right Ear: External ear normal       Left Ear: External ear normal       Ears:      Comments: [place]     Nose: Nose normal  No congestion or rhinorrhea  Mouth/Throat:      Mouth: Mucous membranes are moist       Comments: Missing teeth, + PND   Eyes:      General: No scleral icterus  Right eye: No discharge  Left eye: No discharge  Conjunctiva/sclera: Conjunctivae normal       Pupils: Pupils are equal, round, and reactive to light  Neck:      Musculoskeletal: Normal range of motion and neck supple  No neck rigidity or muscular tenderness  Cardiovascular:      Rate and Rhythm: Normal rate and regular rhythm  Heart sounds: Normal heart sounds  No murmur  No friction rub  No gallop  Pulmonary:      Effort: Pulmonary effort is normal  No respiratory distress  Breath sounds: Normal breath sounds  No stridor  No wheezing, rhonchi or rales  Comments: Pt has sporadic moist cough while getting examined  Chest:      Chest wall: No tenderness  Abdominal:      General: Abdomen is flat  Bowel sounds are normal  There is no distension  Palpations: Abdomen is soft  There is no mass  Tenderness: There is no abdominal tenderness  There is no guarding or rebound  Hernia: No hernia is present  Genitourinary:     Comments: Deferred  Musculoskeletal:         General: No swelling, tenderness, deformity or signs of injury  Right lower leg: No edema  Left lower leg: No edema  Comments: Sitting in her WC  Lymphadenopathy:      Cervical: No cervical adenopathy  Skin:     General: Skin is warm and dry  Coloration: Skin is not jaundiced or pale  Findings: No bruising, erythema, lesion or rash  Comments: Didn't examine sacral area  Neurological:      Cranial Nerves: Cranial nerve deficit present     Psychiatric:         Behavior: Behavior normal  Pertinent Laboratory/Diagnostic Studies:  No lab for this visit     Current Medications   Medication list in facility chart was reviewed and necessary changes made  Assessment and Plan     Cough  No fever, CXR negative  Will continue with cough syrup  Most likely 2nd to allergy  Added Claritin and will continue with monitoring         Marquis Nahed MD  8/2/38823:93 PM

## 2021-06-07 NOTE — ASSESSMENT & PLAN NOTE
No fever, CXR negative  Will continue with cough syrup  Most likely 2nd to allergy  Added Claritin and will continue with monitoring

## 2021-06-09 ENCOUNTER — NURSING HOME VISIT (OUTPATIENT)
Dept: GERIATRICS | Facility: OTHER | Age: 86
End: 2021-06-09
Payer: MEDICARE

## 2021-06-09 DIAGNOSIS — R05.9 COUGH: Primary | ICD-10-CM

## 2021-06-09 PROCEDURE — 99308 SBSQ NF CARE LOW MDM 20: CPT | Performed by: FAMILY MEDICINE

## 2021-06-09 NOTE — ASSESSMENT & PLAN NOTE
Afebrile  Lungs clear  CXR negative  Was lethargic yesterday, today out of bed  Will continue with close monitoring, and cough syrup

## 2021-06-09 NOTE — PROGRESS NOTES
[facility]  [address]  [phone]  Facility: [place] [address]          NAME: Jaden Amaya  AGE: 80 y o  SEX: female    DATE OF ENCOUNTER: 6/9/2021    Chief Complaint     'I feel awful because they are all liars around Me!"    History of Present Illness     HPI    The following portions of the patient's history were reviewed and updated as appropriate (from facility chart and hospital records): allergies, current medications, past family history, past medical history, past social history, past surgical history and problem list   Pt was seen and examined for f/u on cough and also staff's yesterday's report of pt was lethargic  Labs were ordered for today which were negative  Pt's vitals have been stable, she is afebrile  Had some po intake yesterday, and today she is out of bed in her WC  States her cough is better  I didn't hear any cough while examining pt today  Review of Systems     Review of Systems   Unable to perform ROS: Dementia   Constitutional:        "I am surrounded by all these liars!!"   Respiratory:        Cough is better        Active Problem List     Patient Active Problem List   Diagnosis    Breast mass, left    Hyperlipidemia    Hypertension    Late onset Alzheimer's disease with behavioral disturbance ([address] )    Low vitamin D level    Other specified fracture of left pubis, initial encounter for closed fracture ([address] )    Reactive depression    Spinal stenosis of lumbar region with neurogenic claudication    Spondylosis of lumbosacral joint without myelopathy    Permanent atrial fibrillation ([address] )    Ambulatory dysfunction    Adjustment disorder with depressed mood    Weight loss    Elevated temperature    Oropharyngeal dysphagia    Acute pain of left shoulder    Cough       Objective     Vitals: wt:121 9Ib in May   BP, Temp log reviewed     Physical Exam  Vitals signs and nursing note reviewed     Constitutional:       General: She is not in acute distress  Appearance: Normal appearance  She is well-developed  She is not ill-appearing, toxic-appearing or diaphoretic  HENT:      Head: Normocephalic and atraumatic  Right Ear: External ear normal       Left Ear: External ear normal       Ears:      Comments: [place]     Nose: No congestion or rhinorrhea  Mouth/Throat:      Comments: Missing teeth  Eyes:      General: No scleral icterus  Right eye: No discharge  Left eye: No discharge  Conjunctiva/sclera: Conjunctivae normal       Pupils: Pupils are equal, round, and reactive to light  Neck:      Musculoskeletal: Normal range of motion and neck supple  Cardiovascular:      Rate and Rhythm: Normal rate and regular rhythm  Heart sounds: Normal heart sounds  No murmur  No friction rub  No gallop  Pulmonary:      Effort: Pulmonary effort is normal  No respiratory distress  Breath sounds: Normal breath sounds  No stridor  No wheezing, rhonchi or rales  Chest:      Chest wall: No tenderness  Abdominal:      General: Abdomen is flat  Bowel sounds are normal  There is no distension  Palpations: Abdomen is soft  There is no mass  Tenderness: There is no abdominal tenderness  There is no guarding or rebound  Hernia: No hernia is present  Musculoskeletal:         General: No swelling, tenderness, deformity or signs of injury  Right lower leg: No edema  Left lower leg: No edema  Comments: In WC, sitting  Limited ROM of LUE and L shoulder  Lymphadenopathy:      Cervical: No cervical adenopathy  Skin:     General: Skin is warm and dry  Coloration: Skin is not jaundiced or pale  Findings: No bruising, erythema, lesion or rash  Comments: Didn't examine sacral area   Neurological:      Cranial Nerves: Cranial nerve deficit ([place]) present  Comments:  Follows simple commands, limited with pt's dementia          Pertinent Laboratory/Diagnostic Studies:  CBC, CMP today Current Medications   Medication list in facility chart was reviewed and necessary changes made  Assessment and Plan     Cough  Afebrile  Lungs clear  CXR negative  Was lethargic yesterday, today out of bed  Will continue with close monitoring, and cough syrup       Bora Redmond MD  6/9/20211:30 PM

## 2021-06-21 ENCOUNTER — NURSING HOME VISIT (OUTPATIENT)
Dept: GERIATRICS | Facility: OTHER | Age: 86
End: 2021-06-21
Payer: MEDICARE

## 2021-06-21 DIAGNOSIS — F02.81 LATE ONSET ALZHEIMER'S DISEASE WITH BEHAVIORAL DISTURBANCE (HCC): ICD-10-CM

## 2021-06-21 DIAGNOSIS — G30.1 LATE ONSET ALZHEIMER'S DISEASE WITH BEHAVIORAL DISTURBANCE (HCC): ICD-10-CM

## 2021-06-21 DIAGNOSIS — I48.21 PERMANENT ATRIAL FIBRILLATION (HCC): ICD-10-CM

## 2021-06-21 DIAGNOSIS — F43.21 ADJUSTMENT DISORDER WITH DEPRESSED MOOD: ICD-10-CM

## 2021-06-21 DIAGNOSIS — R63.4 WEIGHT LOSS: ICD-10-CM

## 2021-06-21 DIAGNOSIS — I10 ESSENTIAL HYPERTENSION: ICD-10-CM

## 2021-06-21 DIAGNOSIS — R13.12 OROPHARYNGEAL DYSPHAGIA: Primary | ICD-10-CM

## 2021-06-21 PROCEDURE — 99309 SBSQ NF CARE MODERATE MDM 30: CPT | Performed by: FAMILY MEDICINE

## 2021-06-21 RX ORDER — DONEPEZIL HYDROCHLORIDE 10 MG/1
10 TABLET, FILM COATED ORAL
COMMUNITY

## 2021-06-21 RX ORDER — LORATADINE 10 MG/1
10 TABLET ORAL DAILY
COMMUNITY

## 2021-06-21 NOTE — PROGRESS NOTES
[facility]  [address]  [phone]  Facility: [place] [address]          NAME: Sharan Amaya  AGE: 80 y o  SEX: female    DATE OF ENCOUNTER: 6/21/2021    Chief Complaint     Pt has no specific complaint     History of Present Illness     HPI    The following portions of the patient's history were reviewed and updated as appropriate (from facility chart and hospital records): allergies, current medications, past family history, past medical history, past social history, past surgical history and problem list   Pt was see and examined for f/u on cough, Dementia, HTN,Afib  dysphagia, and wt loss  Pt's wt has been stable  Behaviors stable  Tolerating mechanical soft diet  Her cough has been resolved with adding Claritin to her meds  BP stable  And wt has been stable  Pt moves using her WC  HR is controlled  Review of Systems     Review of Systems   Unable to perform ROS: Dementia       Active Problem List     Patient Active Problem List   Diagnosis    Breast mass, left    Hyperlipidemia    Hypertension    Late onset Alzheimer's disease with behavioral disturbance ([address] )    Low vitamin D level    Other specified fracture of left pubis, initial encounter for closed fracture ([address] )    Reactive depression    Spinal stenosis of lumbar region with neurogenic claudication    Spondylosis of lumbosacral joint without myelopathy    Permanent atrial fibrillation ([address] )    Ambulatory dysfunction    Adjustment disorder with depressed mood    Weight loss    Elevated temperature    Oropharyngeal dysphagia    Acute pain of left shoulder    Cough       Objective     Vitals: wt:120 2Ibs       BP:122/62       Afebrile     Physical Exam  Vitals and nursing note reviewed  Constitutional:       General: She is not in acute distress  Appearance: Normal appearance  She is well-developed  She is not ill-appearing, toxic-appearing or diaphoretic     HENT:      Head: Normocephalic and atraumatic  Right Ear: External ear normal       Left Ear: External ear normal       Nose: Nose normal  No congestion or rhinorrhea  Mouth/Throat:      Mouth: Mucous membranes are moist       Comments: Missing teeth   Eyes:      General: No scleral icterus  Right eye: No discharge  Left eye: No discharge  Conjunctiva/sclera: Conjunctivae normal       Pupils: Pupils are equal, round, and reactive to light  Cardiovascular:      Rate and Rhythm: Normal rate  Rhythm irregular  Heart sounds: Normal heart sounds  No murmur heard  No friction rub  No gallop  Pulmonary:      Effort: Pulmonary effort is normal  No respiratory distress  Breath sounds: Normal breath sounds  No stridor  No wheezing, rhonchi or rales  Chest:      Chest wall: No tenderness  Abdominal:      General: Bowel sounds are normal  There is no distension  Palpations: Abdomen is soft  There is no mass  Tenderness: There is no abdominal tenderness  There is no guarding or rebound  Hernia: No hernia is present  Genitourinary:     Comments: Deferred  Musculoskeletal:         General: No swelling, tenderness, deformity or signs of injury  Cervical back: Normal range of motion and neck supple  No rigidity or tenderness  Right lower leg: No edema  Left lower leg: No edema  Comments: In WC, sitting  Limited ROm  Lymphadenopathy:      Cervical: No cervical adenopathy  Skin:     General: Skin is warm and dry  Coloration: Skin is not jaundiced or pale  Findings: No bruising, erythema, lesion or rash  Comments: Didn't examine sacral area  Neurological:      Mental Status: She is alert  Cranial Nerves: Cranial nerve deficit ([place]) present  Comments: Forgetful  Follows commands     Psychiatric:         Behavior: Behavior normal          Pertinent Laboratory/Diagnostic Studies:  6/9:CBC, CMP     Current Medications   Medication list in facility chart was reviewed and no changes made  Assessment and Plan   Oropharyngeal dysphagia  Tolerating mechanical soft diet  Stable  Hypertension  BP stable with Diltiazem  Lab this month stable  Permanent atrial fibrillation (HCC)  HR controlled with Diltiazem, and Digoxin  on ASA  Late onset Alzheimer's disease with behavioral disturbance ([address] )  Stable with Aricept, needs NH care  Wt stable  Weight loss  Wt has been stable  Adjustment disorder with depressed mood  Stable with Mirtazapine  Will continue with monitoring         Joseph Martinez MD  8/90/38574:77 PM

## 2021-08-16 ENCOUNTER — NURSING HOME VISIT (OUTPATIENT)
Dept: GERIATRICS | Facility: OTHER | Age: 86
End: 2021-08-16
Payer: MEDICARE

## 2021-08-16 DIAGNOSIS — F02.81 LATE ONSET ALZHEIMER'S DISEASE WITH BEHAVIORAL DISTURBANCE (HCC): ICD-10-CM

## 2021-08-16 DIAGNOSIS — G30.1 LATE ONSET ALZHEIMER'S DISEASE WITH BEHAVIORAL DISTURBANCE (HCC): ICD-10-CM

## 2021-08-16 DIAGNOSIS — F32.9 REACTIVE DEPRESSION: ICD-10-CM

## 2021-08-16 DIAGNOSIS — F43.21 ADJUSTMENT DISORDER WITH DEPRESSED MOOD: Primary | ICD-10-CM

## 2021-08-16 PROCEDURE — 99309 SBSQ NF CARE MODERATE MDM 30: CPT | Performed by: NURSE PRACTITIONER

## 2021-08-23 NOTE — PROGRESS NOTES
MEDICATION MANAGEMENT NOTE        [facility]  POS: [address]: [address], [address] [facility]      Name and Date of Birth: Xi Roa 80 y o  9/2/1927 MRN: [id number]    Date of Visit: August 16, 2021    Allergies   Allergen Reactions    Ace Inhibitors Other (See Comments)     Unknown( patient denies this)    Cisapride Other (See Comments)     Unknown ( patient denies this)    Penicillins Other (See Comments)     SUBJECTIVE:    Tejinder Ibarra is seen today for a follow up for depression, mood disorder, anxiety and dementia  She continues to experience significant symptoms since the last visit  Staff report some increase in agitation, she has been threatening to hit staff, harm roommate, verbally abusive toward staff and other residents  Was seen by psychiatry last month and started on Lexapro and titrated up to 10 mg  She continues to endose being unhappy "being here"  Will add Depakote sprinkle for agitation and follow up in 2 weeks  She denies any side effects from current psychiatric medications  PLAN:    All medications and routine orders were reviewed and updated as needed      Add Depakote Sprinkles 125 mg bid  Medication management every 2 weeks  Plan discussed with: Nurse    Diagnoses and all orders for this visit:    Adjustment disorder with depressed mood    Late onset Alzheimer's disease with behavioral disturbance (HCC)    Reactive depression        Current Outpatient Medications on File Prior to Visit   Medication Sig Dispense Refill    aspirin 81 mg chewable tablet Chew 81 mg daily      calcium carbonate (TUMS) 500 mg chewable tablet Chew 1 tablet daily      cholecalciferol (VITAMIN D3) 1,000 units tablet Take 1,000 Units by mouth daily      digoxin (LANOXIN) 0 125 mg tablet Take 125 mcg by mouth daily      diltiazem (TIAZAC) 240 MG 24 hr capsule Take 240 mg by mouth daily      donepezil (ARICEPT) 10 mg tablet Take 10 mg by mouth daily at bedtime      loratadine (CLARITIN) 10 mg tablet Take 10 mg by mouth daily      mirtazapine (REMERON) 7 5 MG tablet Take 7 5 mg by mouth daily at bedtime      naproxen sodium (ALEVE) 220 MG tablet Take 220 mg by mouth daily      senna-docusate sodium (SENOKOT S) 8 6-50 mg per tablet Take 1 tablet by mouth daily       No current facility-administered medications on file prior to visit  Psychotherapy Provided:     Individual psychotherapy provided: Yes  Supportive counseling provided  Reassurance and supportive therapy provided  HPI ROS Appetite Changes and Sleep:     She reports fluctuating sleep pattern, fluctuating appetite, fluctuating energy levels   Patient denies suicidal or homicidal ideation    Review Of Systems:   all other systems are negative         Mental Status Evaluation:    Appearance:  age appropriate   Behavior:  guarded   Speech:  slow   Mood:  irritable   Affect:  flat   Thought Process:  circumstantial   Associations: concrete associations   Thought Content:  mild paranoia   Perceptual Disturbances: none   Risk Potential: Suicidal ideation - None  Homicidal ideation - None  Potential for aggression - Yes, due to agitation   Sensorium:  oriented to person   Memory:  recent memory impaired   Consciousness:  alert and awake   Attention: decreased concentration and decreased attention span   Insight:  limited   Judgment: poor   Gait/Station: in wheelchair   Motor Activity: no abnormal movements     Past Psychiatric History Update:     Inpatient Psychiatric Admission Since Last Encounter:   no  Suicide Attempt Or Self Mutilation Since Last Encounter:   no  Incidence of Violent Behavior Since Last Encounter:   no    Traumatic History Update:     New Onset of Abuse Since Last Encounter:   no  Traumatic Events Since Last Encounter:   no    Past Medical History:    Past Medical History:   Diagnosis Date    Acute kidney failure ([facility] ) 3/15/2021    Altered mental state 3/12/2021    Alzheimer disease ([facility] )     Breast mass, left     Closed fracture of multiple pubic rami, left, initial encounter ([facility] )     HLD (hyperlipidemia)     HTN (hypertension)     Vertebral artery stenosis      No past medical history pertinent negatives  Past Surgical History:   Procedure Laterality Date    APPENDECTOMY      CARPAL TUNNEL RELEASE      CATARACT EXTRACTION      CHOLECYSTECTOMY      TOTAL ABDOMINAL HYSTERECTOMY       Allergies   Allergen Reactions    Ace Inhibitors Other (See Comments)     Unknown( patient denies this)    Cisapride Other (See Comments)     Unknown ( patient denies this)    Penicillins Other (See Comments)     Substance Abuse History:    Social History     Substance and Sexual Activity   Alcohol Use Not Currently     Social History     Substance and Sexual Activity   Drug Use Never     Social History:    Changes since last encounter:  no  Family Psychiatric History:     Family History   Family history unknown: Yes     History Review: The following portions of the patient's history were reviewed and updated as appropriate: allergies, current medications, past family history, past medical history, past social history, past surgical history and problem list     OBJECTIVE:     Vital signs in last 24 hours: Vital signs and nursing notes reviewed in facility chart  Laboratory Results: Lab results reviewed in facility chart  Medications Risks/Benefits:      Risks, Benefits And Possible Side Effects Of Medications:    Discussed risks and benefits of treatment with patient including :N/A and unable to comprehend     Controlled Medication Discussion:     Naomy Mooney has been filling controlled prescriptions on time as prescribed according to [facility]    Evaluation of Psychotropic Drugs for possible gradual dose reductions    Psychotropic medications have been reviewed  Patient continues with symptoms of mood disorder with agitation as noted above    Any/or further dose reductions at this time would be clinically contraindicated, as it would be likely to cause worsening of symptoms          SUMAN Stone

## 2023-06-02 NOTE — ASSESSMENT & PLAN NOTE
Close monitoring  Will check labs  Vitals q shift for 3 days 
Multifactorial  Needs NH care  Continues with PT/OT 
No behaviors  Loosing wt  Will continue with monitoring  Needs NH care  St on board 
No pain  F/u with ortho this month  Continues with therapy  Stable 
On  Digoxin  HR Controlled with Diltiazem, and ASA  Will check dig level 
On Diltiazem  Stable BP  Will monitor closely 
On low dose of Celexa  E-lytes stable on today's lab  Will continue with monitoring 
Variable meal completion  Will check labs 
No